# Patient Record
Sex: MALE | Race: WHITE | NOT HISPANIC OR LATINO | URBAN - METROPOLITAN AREA
[De-identification: names, ages, dates, MRNs, and addresses within clinical notes are randomized per-mention and may not be internally consistent; named-entity substitution may affect disease eponyms.]

---

## 2018-11-25 ENCOUNTER — EMERGENCY (EMERGENCY)
Facility: HOSPITAL | Age: 23
LOS: 1 days | Discharge: HOME | End: 2018-11-25
Admitting: EMERGENCY MEDICINE
Payer: COMMERCIAL

## 2018-11-25 VITALS
RESPIRATION RATE: 18 BRPM | TEMPERATURE: 98 F | DIASTOLIC BLOOD PRESSURE: 74 MMHG | HEART RATE: 84 BPM | SYSTOLIC BLOOD PRESSURE: 136 MMHG | OXYGEN SATURATION: 99 %

## 2018-11-25 PROCEDURE — 99283 EMERGENCY DEPT VISIT LOW MDM: CPT | Mod: 25

## 2018-11-25 PROCEDURE — 73610 X-RAY EXAM OF ANKLE: CPT | Mod: 26,RT

## 2018-11-25 PROCEDURE — 73130 X-RAY EXAM OF HAND: CPT | Mod: 26,RT

## 2018-11-25 PROCEDURE — 12004 RPR S/N/AX/GEN/TRK7.6-12.5CM: CPT

## 2018-11-25 RX ORDER — IBUPROFEN 200 MG
600 TABLET ORAL ONCE
Qty: 0 | Refills: 0 | Status: COMPLETED | OUTPATIENT
Start: 2018-11-25 | End: 2018-11-25

## 2018-11-25 RX ORDER — IBUPROFEN 200 MG
1 TABLET ORAL
Qty: 20 | Refills: 0 | OUTPATIENT
Start: 2018-11-25

## 2018-11-25 RX ADMIN — Medication 600 MILLIGRAM(S): at 02:15

## 2018-11-25 RX ADMIN — Medication 600 MILLIGRAM(S): at 02:10

## 2018-11-25 NOTE — ED PROVIDER NOTE - PHYSICAL EXAMINATION
Vital Signs - nursing notes reviewed and confirmed  Gen - WDWN M, +AOB, NAD, comfortable and non-toxic appearing, speaking in full sentences   Skin - warm, dry, 3cm V shaped flap like laceration to the R distal thumb, 1cm linear laceration to the mid palm, 5cm stellate laceration to the R lateral malleolus with active bleeding, no visible FB, tendon or bony exposure noted   HEENT - AT/NC, PERRL, EOMI, no conjunctival injection, moist oral mucosa, TM intact b/l with good cone of lights, o/p clear with no erythema, edema, or exudate, uvula midline, airway patent, neck supple and NT, FROM  CV - S1S2, R/R/R  Resp - respiration non-labored, CTAB, symmetric bs b/l, no r/r/w  GI - NABS, soft, ND, NT, no rebound or guarding, no CVAT b/l   MS - w/w/p, L ankle +edema and TTP over stellate laceration with active bleeding, no bony or tendon exposure, no erythema, ecchymosis, crepitus, joint laxity, or deformity, restricted ROM 2/2 pain, NV intact. +SILT, symmetric palpable distal pulses b/l , calves supple and NT  Neuro - AxOx3, no focal neuro deficits, CN II-XII grossly intact, cerebellar function intact, negative pronator drift, negative nystagmus, ambulatory with mild limp
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
## 2018-11-25 NOTE — ED PROVIDER NOTE - NSFOLLOWUPINSTRUCTIONS_ED_ALL_ED_FT
NON-DISSOLVABLE SUTURES  * Please note minor swelling, redness, pain, itching, and occasional bleeding (that’s controlled by direct pressure) are common with all wounds and normally will go away as wound heals     • Keep dressing clean and dry for 24 hours   • May gently clean wound with soap and water after 24 hours to avoid crusting – PAT DRY after each wash  • Open wound to air or loosen Band-Aid to allow aeration   • Apply Bacitracin or Neosporin ointment twice daily    • Return in 10 days for suture removal to the R thumb, and 14 days for R ankle     PLEASE SEEK MEDICAL ATTENTION OR RETURN TO ER IMMEDIATELY IF:  * Swelling, redness, or pain increases and cannot be controlled by prescribed pain medication  * Wound feels warm to touch   * Fever >100.4F  * Wound edges reopen/separate   * Foul smelling discharge from wound   * Uncontrolled bleeding with direct pressure  * Increased numbness/tingling/discoloration of wound site
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
## 2018-11-25 NOTE — ED PROVIDER NOTE - DIAGNOSTIC INTERPRETATION
Xray (wet reads) interpreted by MICHAEL RUTLEDGE   Xray ankle/hand - +soft tissue defects. no acute fx or dislocation, joint space intact, no effusion noted. No foreign body noted

## 2018-11-25 NOTE — ED ADULT NURSE NOTE - CHPI ED NUR SYMPTOMS NEG
no drainage/no blood in mucus/no chills/no vomiting/no fever/no purulent drainage/no rectal pain/no redness
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
## 2018-11-25 NOTE — ED ADULT NURSE NOTE - NS_ED_NURSE_TEACHING_TOPIC_ED_A_ED
pt instructed to return to ed, urgent care or pcp in 10-14 days for suture removal and educated on wound care
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
## 2018-11-29 DIAGNOSIS — S91.011A LACERATION WITHOUT FOREIGN BODY, RIGHT ANKLE, INITIAL ENCOUNTER: ICD-10-CM

## 2018-11-29 DIAGNOSIS — F17.200 NICOTINE DEPENDENCE, UNSPECIFIED, UNCOMPLICATED: ICD-10-CM
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
## 2018-11-29 DIAGNOSIS — T74.11XA ADULT PHYSICAL ABUSE, CONFIRMED, INITIAL ENCOUNTER: ICD-10-CM

## 2018-11-29 DIAGNOSIS — S61.411A LACERATION WITHOUT FOREIGN BODY OF RIGHT HAND, INITIAL ENCOUNTER: ICD-10-CM
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
## 2018-11-29 DIAGNOSIS — S91.012A LACERATION WITHOUT FOREIGN BODY, LEFT ANKLE, INITIAL ENCOUNTER: ICD-10-CM
